# Patient Record
Sex: FEMALE | Race: WHITE | Employment: STUDENT | URBAN - METROPOLITAN AREA
[De-identification: names, ages, dates, MRNs, and addresses within clinical notes are randomized per-mention and may not be internally consistent; named-entity substitution may affect disease eponyms.]

---

## 2017-05-31 ENCOUNTER — TELEPHONE (OUTPATIENT)
Dept: NEUROLOGY | Facility: CLINIC | Age: 47
End: 2017-05-31

## 2017-05-31 ENCOUNTER — OFFICE VISIT (OUTPATIENT)
Dept: NEUROLOGY | Facility: CLINIC | Age: 47
End: 2017-05-31

## 2017-05-31 VITALS
BODY MASS INDEX: 33.32 KG/M2 | DIASTOLIC BLOOD PRESSURE: 81 MMHG | HEIGHT: 65 IN | HEART RATE: 82 BPM | RESPIRATION RATE: 16 BRPM | WEIGHT: 200 LBS | SYSTOLIC BLOOD PRESSURE: 131 MMHG

## 2017-05-31 DIAGNOSIS — R53.1 WEAKNESS GENERALIZED: Primary | ICD-10-CM

## 2017-05-31 DIAGNOSIS — R25.2 MUSCLE CRAMPING: ICD-10-CM

## 2017-05-31 DIAGNOSIS — G72.9 MYOPATHY: ICD-10-CM

## 2017-05-31 DIAGNOSIS — G37.9 DEMYELINATING DISEASE (HCC): ICD-10-CM

## 2017-05-31 PROCEDURE — 99204 OFFICE O/P NEW MOD 45 MIN: CPT | Performed by: OTHER

## 2017-05-31 RX ORDER — VALSARTAN 80 MG/1
1 TABLET ORAL DAILY
Refills: 1 | COMMUNITY
Start: 2017-04-04

## 2017-05-31 RX ORDER — CANAGLIFLOZIN 100 MG/1
1 TABLET, FILM COATED ORAL DAILY
Refills: 1 | COMMUNITY
Start: 2017-05-08

## 2017-05-31 RX ORDER — LORAZEPAM 1 MG/1
TABLET ORAL
Qty: 2 TABLET | Refills: 0 | Status: SHIPPED | OUTPATIENT
Start: 2017-05-31 | End: 2017-06-27

## 2017-05-31 RX ORDER — INSULIN GLARGINE 100 [IU]/ML
75 INJECTION, SOLUTION SUBCUTANEOUS DAILY
Refills: 3 | COMMUNITY
Start: 2017-05-05

## 2017-05-31 RX ORDER — INSULIN LISPRO 100 [IU]/ML
70 INJECTION, SOLUTION INTRAVENOUS; SUBCUTANEOUS DAILY
Refills: 2 | COMMUNITY
Start: 2017-05-05

## 2017-05-31 RX ORDER — NICOTINE POLACRILEX 4 MG/1
GUM, CHEWING ORAL
COMMUNITY

## 2017-05-31 RX ORDER — ZOLPIDEM TARTRATE 10 MG/1
1 TABLET ORAL NIGHTLY
Refills: 5 | COMMUNITY
Start: 2017-05-03

## 2017-05-31 RX ORDER — IBUPROFEN 200 MG
200 TABLET ORAL EVERY 6 HOURS PRN
COMMUNITY

## 2017-05-31 NOTE — PROGRESS NOTES
[id number] [place] [address] with [facility]  5/31/2017    1:30 PM      Cc: Whole body weakness    HPI:  She had been seen by me in 2005 in my previous practice for \"same things\".   We had seen some myopathic changes outpatient prescriptions:   •  INVOKANA 100 MG Oral Tab, Take 1 tablet by mouth daily. , Disp: , Rfl: 1  •  LANTUS SOLOSTAR 100 UNIT/ML Subcutaneous Solution Pen-injector, Inject 75 Units into the skin daily. , Disp: , Rfl: 3  •  HUMALOG KWIKPEN 100 UNIT/ML Glycogen storage disease        Lev Rosales MD  Vascular & General Neurology  Director, Multiple Sclerosis Program  [facility]  5/31/2017, Time completed 1:55 PM

## 2017-05-31 NOTE — PATIENT INSTRUCTIONS
Refill policies:    • Allow 2-3 business days for refills; controlled substances may take longer.   • Contact your pharmacy at least 5 days prior to running out of medication and have them send an electronic request or submit request through the [facility] have a procedure or additional testing performed. [facility]) will contact your insurance carrier to obtain pre-certification or prior authorization.     Unfortunately, [facility] has seen an increase in denial of payment even though the p

## 2017-06-01 ENCOUNTER — TELEPHONE (OUTPATIENT)
Dept: SURGERY | Facility: CLINIC | Age: 47
End: 2017-06-01

## 2017-06-01 NOTE — TELEPHONE ENCOUNTER
Started SONJA Leal MA for MRI Brain cpt code 04.52.16.63.71 and spoke with rep Osmin CASILLAS approved  Auth# [id number]  Valid form 06/01/17 to 07/16/17 at [facility] location  Call duration 27:38    Contacted pt and advised of response to make appt

## 2017-06-12 NOTE — TELEPHONE ENCOUNTER
Received a fax from [facility], Lorazepam 1mg tab has been approved    [id number]

## 2017-06-14 ENCOUNTER — TELEPHONE (OUTPATIENT)
Dept: NEUROLOGY | Facility: CLINIC | Age: 47
End: 2017-06-14

## 2017-06-15 ENCOUNTER — TELEPHONE (OUTPATIENT)
Dept: NEUROLOGY | Facility: CLINIC | Age: 47
End: 2017-06-15

## 2017-06-19 ENCOUNTER — TELEPHONE (OUTPATIENT)
Dept: NEUROLOGY | Facility: CLINIC | Age: 47
End: 2017-06-19

## 2017-06-19 NOTE — TELEPHONE ENCOUNTER
Michelle Delarosa calling to report that after her MRI study her hand and arm have been slightly swollen and very painful. She started applying ice and elevating on Saturday and reports some improvement in symptoms. Denies discoloration or significant swelling.  Yusef Valencia

## 2017-06-27 ENCOUNTER — OFFICE VISIT (OUTPATIENT)
Dept: NEUROLOGY | Facility: CLINIC | Age: 47
End: 2017-06-27

## 2017-06-27 VITALS
BODY MASS INDEX: 33.32 KG/M2 | DIASTOLIC BLOOD PRESSURE: 75 MMHG | SYSTOLIC BLOOD PRESSURE: 120 MMHG | WEIGHT: 200 LBS | HEART RATE: 80 BPM | HEIGHT: 65 IN | RESPIRATION RATE: 16 BRPM

## 2017-06-27 DIAGNOSIS — R53.1 WEAKNESS GENERALIZED: Primary | ICD-10-CM

## 2017-06-27 PROCEDURE — 99215 OFFICE O/P EST HI 40 MIN: CPT | Performed by: OTHER

## 2017-06-27 NOTE — PATIENT INSTRUCTIONS
Please have EMG done at earliest convenience  Follow up after testing done   Refill policies:    • Allow 2-3 business days for refills; controlled substances may take longer.   • Contact your pharmacy at least 5 days prior to running out of medication and h Precertification and Prior Authorizations  If your physician has recommended that you have a procedure or additional testing performed.   [facility]) will contact your insurance carrier to obtain pre-certification or prior author

## 2017-06-27 NOTE — PROGRESS NOTES
[facility] Progress Note    HPI  Patient presents with:  Neurologic Problem: Follow up on neuromuscular issues      Patient is new to this author, but previously seen by my neurology associate, Dr. Fermin Heredia.          She presents today brain. Past Medical History:   Diagnosis Date   • Abnormality of gait 9/2005    admitted to [facility], extensive w/u incl.  LP negative, 2nd opinion at [facility]   • DIABETES    • Fibromyalgia    • KIDNEY STONE    • Lyme disease     L/E and generalized weakne TABLET DAILY, Disp: , Rfl:   •  JOLESSA 0.15-0.03 MG OR TABS, 1 TABLET DAILY, Disp: , Rfl:   •  ZYRTEC 10 MG OR TABS, 1 TABLET DAILY, Disp: , Rfl:     Review of Systems:  No chest pain or palpitations; otherwise as noted in HPI.     Exam:  /75 (BP Loc extensive lab testing (myasthenia Ab, carnitine, acyl carnitine and ratio, lactate, Ca channel Ab, copper zinc) as well as normal MRI brain.          Etiology is unclear but given her continued symptoms and possible prior EMG and muscle biopsy findings, tari

## 2017-07-13 ENCOUNTER — OFFICE VISIT (OUTPATIENT)
Dept: NEUROLOGY | Facility: CLINIC | Age: 47
End: 2017-07-13

## 2017-07-13 DIAGNOSIS — R53.1 WEAKNESS GENERALIZED: Primary | ICD-10-CM

## 2017-07-13 PROCEDURE — 95913 NRV CNDJ TEST 13/> STUDIES: CPT | Performed by: OTHER

## 2017-07-13 PROCEDURE — 95886 MUSC TEST DONE W/N TEST COMP: CPT | Performed by: OTHER

## 2017-07-17 NOTE — PROCEDURES
[facility]  [address] [place], [address] [facility]  Ph: [phone]  FAX: [phone]        Full Name: Snow Avila Gender: Female  Patient ID: [id number] YOB: 1970      Visit Date: 7/13/2017 10:44  Age: Nerve / Sites Muscle Latency Amplitude Amp % Duration Area Segments Distance Lat Diff Velocity     ms mV % ms mVms  cm ms m/s   R Median - APB      Wrist APB 3.13 6.9 100 6.77 20.3 Wrist - APB 7        Elbow APB 7.24 6.1 88.5 6.67 18.9 Elbow - Wrist 22 4. 1 R. Biceps brachii Musculocutaneous C5-C6 N None None None None N N N N   R. Triceps brachii Radial C6-C8 N None None None None N N N N   R. Extensor digitorum communis Radial C7-C8 N None None None None N N N N   R.  First dorsal interosseous Ulnar C8-T1 N This is a midly abnormal study.   There is evidence to suggest a mild R median nerve entrapment across the wrist (ie carpal tunnel syndrome)     There is no electrophysiologic evidence consistent with a large fiber neuropathy or myopathy as clinically quest

## 2017-08-10 ENCOUNTER — OFFICE VISIT (OUTPATIENT)
Dept: NEUROLOGY | Facility: CLINIC | Age: 47
End: 2017-08-10

## 2017-08-10 VITALS
HEIGHT: 65 IN | BODY MASS INDEX: 33.32 KG/M2 | HEART RATE: 82 BPM | WEIGHT: 200 LBS | RESPIRATION RATE: 16 BRPM | SYSTOLIC BLOOD PRESSURE: 124 MMHG | DIASTOLIC BLOOD PRESSURE: 74 MMHG

## 2017-08-10 DIAGNOSIS — R79.82 ELEVATED C-REACTIVE PROTEIN (CRP): Primary | ICD-10-CM

## 2017-08-10 DIAGNOSIS — G43.709 CHRONIC MIGRAINE WITHOUT AURA WITHOUT STATUS MIGRAINOSUS, NOT INTRACTABLE: ICD-10-CM

## 2017-08-10 DIAGNOSIS — IMO0001 PARESTHESIAS/NUMBNESS: ICD-10-CM

## 2017-08-10 DIAGNOSIS — G43.109 BASILAR MIGRAINE: ICD-10-CM

## 2017-08-10 PROCEDURE — 99214 OFFICE O/P EST MOD 30 MIN: CPT | Performed by: OTHER

## 2017-08-10 NOTE — PROGRESS NOTES
[facility] Progress Note    HPI  Patient presents with:  Weakness:  Follow up on EMG results  Migraine: Patient was in the hospital last week due to having a migraine and still has a headache    As per my initial visit, 6/27/17:   \"  Pa Other labs include normal Ca Channel AB, AChR Ab, carnitine, acyl carnitine, lactic acid, and intrinsic factor blocking Ab. Patient has also had normal ischemic forearm test, copper, Zinc and MRI brain. \"          Patient now presents in follow up.    Xochilt Grandchild Marital status:             Spouse name:                       Years of education:                 Number of children:               Social History Main Topics    Smoking status: Never Smoker Extremities: no edema, peripheral pulses intact    Neck: supple, full range of motion; no carotid bruits    Neuro:  Mental status:  Orientation: Alert and oriented to person, place, time  Speech Fluent and conversational    CN: PERRL, EOMI with no nystagmu     ms mV % ms mVms   cm ms m/s   R Median - APB      Wrist APB 3.13 6.9 100 6.77 20.3 Wrist - APB 7          Elbow APB 7.24 6.1 88.5 6.67 18.9 Elbow - Wrist 22 4.11 53   L Median - APB      Wrist APB 2.92 12.8 100 6.15 38.5 Wrist - APB 7          Elbow AP R. Triceps brachii Radial C6-C8 N None None None None N N N N   R. Extensor digitorum communis Radial C7-C8 N None None None None N N N N   R. First dorsal interosseous Ulnar C8-T1 N None None None None N N N N   L.  Vastus medialis Femoral L2-L4 N None Non There is no electrophysiologic evidence consistent with a large fiber neuropathy or myopathy as clinically questioned. Impression/ Plan:  Lilly Monte is a 49year old woman with PMHx significant for fibromyalgia as well as DM2 and ?  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

## 2017-08-10 NOTE — PATIENT INSTRUCTIONS
Have lab work done  [employer] verapamil 120 mg daily   Take ASA 81 mg daily   Follow up in 1-2 months   Refill policies:    • Allow 2-3 business days for refills; controlled substances may take longer.   • Contact your pharmacy at least 5 days prior to running billed. Precertification and Prior Authorizations  If your physician has recommended that you have a procedure or additional testing performed.   [facility]) will contact your insurance carrier to obtain pre-certification or arline

## 2017-08-17 ENCOUNTER — TELEPHONE (OUTPATIENT)
Dept: NEUROLOGY | Facility: CLINIC | Age: 47
End: 2017-08-17

## 2017-08-17 NOTE — TELEPHONE ENCOUNTER
Started on verapamil Sunday night. Has episode of \"a fib\" a couple times a night. Heart is fluttering for a minute or two and then it stops. Wishing to discontinue medication due to side effects.

## 2017-08-17 NOTE — TELEPHONE ENCOUNTER
Per Dr. Raj Worthy okay to discontinue medication at this time. She may try 40 mg TID dosing instead. Patient informed. She would prefer to discontinue at this time.  She may consider restarting at the lower TID dosing and will call office if she does deci

## 2017-09-08 ENCOUNTER — TELEPHONE (OUTPATIENT)
Dept: NEUROLOGY | Facility: CLINIC | Age: 47
End: 2017-09-08

## 2017-10-03 ENCOUNTER — OFFICE VISIT (OUTPATIENT)
Dept: NEUROLOGY | Facility: CLINIC | Age: 47
End: 2017-10-03

## 2017-10-03 VITALS — HEART RATE: 106 BPM | SYSTOLIC BLOOD PRESSURE: 132 MMHG | DIASTOLIC BLOOD PRESSURE: 78 MMHG | RESPIRATION RATE: 18 BRPM

## 2017-10-03 DIAGNOSIS — R79.82 ELEVATED C-REACTIVE PROTEIN (CRP): ICD-10-CM

## 2017-10-03 DIAGNOSIS — R29.818 TRANSIENT NEUROLOGICAL SYMPTOMS: Primary | ICD-10-CM

## 2017-10-03 DIAGNOSIS — G43.709 CHRONIC MIGRAINE WITHOUT AURA WITHOUT STATUS MIGRAINOSUS, NOT INTRACTABLE: ICD-10-CM

## 2017-10-03 PROCEDURE — 99214 OFFICE O/P EST MOD 30 MIN: CPT | Performed by: OTHER

## 2017-10-03 NOTE — PATIENT INSTRUCTIONS
Have EEG done at earliest convenience  Schedule skin biopsy prior to next visit  Follow up in 3 months, after all testing and results done   Refill policies:    • Allow 2-3 business days for refills; controlled substances may take longer.   • Contact your p may be responsible for the entire amount billed. Precertification and Prior Authorizations  If your physician has recommended that you have a procedure or additional testing performed.   [facility] ([facility]) will contact your insurance c

## 2017-10-03 NOTE — PROGRESS NOTES
[facility] Progress Note    HPI  Patient presents with:  Migraine    As per my initial visit, 6/27/17:   \"  Patient is new to this author, but previously seen by my neurology associate, Dr. Isma Pappas.          She presents today for yaniv Other labs include normal Ca Channel AB, AChR Ab, carnitine, acyl carnitine, lactic acid, and intrinsic factor blocking Ab. Patient has also had normal ischemic forearm test, copper, Zinc and MRI brain. \"          Patient now presents in follow up. Smoking status: Never Smoker                                                                Smokeless tobacco: Never Used                        Alcohol use:  No              Drug use: No            Other Topics            Concern  Caffeine Concern CN: PERRL, EOMI with no nystagmus, VFF, smile symmetric, sensation intact, tongue and palate midline, SCM/hearing intact, otherwise, CN 2-12 intact  Motor: 5/5 strength throughout, tone normal  DTR: 3+ brisk symmetric throughout, toes downgoing bilaterally Wrist APB 2.92 12.8 100 6.15 38.5 Wrist - APB 7          Elbow APB 7.24 11.4 88.9 6.35 33.9 Elbow - Wrist 22 4.32 51   R Ulnar - ADM      Wrist ADM 1.93 10.9 100 5.16 33.9 Wrist - ADM 7          B. Elbow ADM 5.47 10.0 91.4 5.26 33.7 B. Elbow - Wrist 20.5 R. First dorsal interosseous Ulnar C8-T1 N None None None None N N N N   L. Vastus medialis Femoral L2-L4 N None None None None N N N N   L. Tibialis anterior Deep peroneal (Fibular) L4-L5 N None None None None N N N N   L.  Gastrocnemius (Medial head) Tibi Tomasa White is a 49year old woman with PMHx significant for fibromyalgia as well as DM2 and ? Lyme disease, who originally presented for evaluation of generalized fatigue and weakness.             Neurologic exam is normal at this time with no evidence fo Return in about 3 months (around 1/3/2018).      30 total minutes spent with patient >50% of visit was spent in counseling and coordination of care, including discussion of differential diagnosis, workup to date, future diagnostic and therapeutic options; p

## 2018-01-17 ENCOUNTER — TELEPHONE (OUTPATIENT)
Dept: NEUROLOGY | Facility: CLINIC | Age: 48
End: 2018-01-17

## 2018-01-30 ENCOUNTER — TELEPHONE (OUTPATIENT)
Dept: NEUROLOGY | Facility: CLINIC | Age: 48
End: 2018-01-30

## 2018-01-30 DIAGNOSIS — G43.709 CHRONIC MIGRAINE WITHOUT AURA WITHOUT STATUS MIGRAINOSUS, NOT INTRACTABLE: Primary | ICD-10-CM

## 2018-01-30 NOTE — TELEPHONE ENCOUNTER
At EEG she had strobe light and that triggered a migraine; she has not been able to shake it. Sometimes it waxes and wanes, but last night she was vomiting again. Until this migraines were under control.  She can usually use caffeine / NSAID with good re

## 2018-02-01 RX ORDER — SUMATRIPTAN 50 MG/1
50 TABLET, FILM COATED ORAL EVERY 2 HOUR PRN
Qty: 9 TABLET | Refills: 0 | Status: SHIPPED | OUTPATIENT
Start: 2018-02-01 | End: 2018-02-06

## 2018-02-01 NOTE — TELEPHONE ENCOUNTER
We can give sumatriptan for migraine abortive treatment if agrees     Will send Rx - let patient know

## 2018-02-06 ENCOUNTER — OFFICE VISIT (OUTPATIENT)
Dept: NEUROLOGY | Facility: CLINIC | Age: 48
End: 2018-02-06

## 2018-02-06 VITALS
SYSTOLIC BLOOD PRESSURE: 127 MMHG | WEIGHT: 200 LBS | DIASTOLIC BLOOD PRESSURE: 77 MMHG | HEIGHT: 65 IN | RESPIRATION RATE: 16 BRPM | HEART RATE: 78 BPM | BODY MASS INDEX: 33.32 KG/M2

## 2018-02-06 DIAGNOSIS — G43.711 INTRACTABLE CHRONIC MIGRAINE WITHOUT AURA AND WITH STATUS MIGRAINOSUS: Primary | ICD-10-CM

## 2018-02-06 DIAGNOSIS — G44.209 TENSION HEADACHE: ICD-10-CM

## 2018-02-06 DIAGNOSIS — M62.838 MUSCLE SPASM: ICD-10-CM

## 2018-02-06 PROCEDURE — 99214 OFFICE O/P EST MOD 30 MIN: CPT | Performed by: OTHER

## 2018-02-06 PROCEDURE — 96372 THER/PROPH/DIAG INJ SC/IM: CPT | Performed by: OTHER

## 2018-02-06 RX ORDER — SUMATRIPTAN 100 MG/1
100 TABLET, FILM COATED ORAL EVERY 2 HOUR PRN
Qty: 9 TABLET | Refills: 0 | Status: SHIPPED | OUTPATIENT
Start: 2018-02-06 | End: 2018-03-08

## 2018-02-06 RX ORDER — CYCLOBENZAPRINE HCL 10 MG
10 TABLET ORAL 3 TIMES DAILY PRN
Qty: 90 TABLET | Refills: 0 | Status: SHIPPED | OUTPATIENT
Start: 2018-02-06

## 2018-02-06 RX ORDER — AMITRIPTYLINE HYDROCHLORIDE 10 MG/1
TABLET, FILM COATED ORAL
Qty: 60 TABLET | Refills: 2 | Status: SHIPPED | OUTPATIENT
Start: 2018-02-06

## 2018-02-06 RX ORDER — KETOROLAC TROMETHAMINE 30 MG/ML
30 INJECTION, SOLUTION INTRAMUSCULAR; INTRAVENOUS ONCE
Status: COMPLETED | OUTPATIENT
Start: 2018-02-06 | End: 2018-02-06

## 2018-02-06 RX ADMIN — Medication 10 MG: at 11:00:00

## 2018-02-06 RX ADMIN — KETOROLAC TROMETHAMINE 30 MG: 30 INJECTION, SOLUTION INTRAMUSCULAR; INTRAVENOUS at 11:00:00

## 2018-02-06 NOTE — PATIENT INSTRUCTIONS
Take amitriptyline 10 mg nightly x 1 week, then increase to 20 mg nightly   Follow up in 2 months   Refill policies:    • Allow 2-3 business days for refills; controlled substances may take longer.   • Contact your pharmacy at least 5 days prior to running entire amount billed. Precertification and Prior Authorizations  If your physician has recommended that you have a procedure or additional testing performed.   [facility]) will contact your insurance carrier to obtain pre-certif

## 2018-02-06 NOTE — PROGRESS NOTES
[facility] Progress Note    HPI  Patient presents with:  Weakness: Follow up and having numbnedss in the right arm  Migraine:  Follow up and having a migraine since EEG    As per my initial visit, 6/27/17:   \"  Patient is new to this au Other labs include normal Ca Channel AB, AChR Ab, carnitine, acyl carnitine, lactic acid, and intrinsic factor blocking Ab. Patient has also had normal ischemic forearm test, copper, Zinc and MRI brain. \"          Patient now presents in follow up.    A Comment: Muscles  6/15/09: COLONOSCOPY,DIAGNOSTIC      Comment: colon and TI wnl, random bx wnl  No date: OTHER SURGICAL HISTORY      Comment: ESWL for kidney stones  6/15/09: UPPER GI ENDOSCOPY,BIOPSY      Comment: wnl, SB Biopsywnl  History reviewed. •  Omeprazole 20 MG Oral Tab EC, Take by mouth., Disp: , Rfl:   •  PAROXETINE HCL 20 MG OR TABS, 1 TABLET DAILY, Disp: , Rfl:   •  ZYRTEC 10 MG OR TABS, 1 TABLET DAILY, Disp: , Rfl:     Review of Systems:  No chest pain or palpitations; otherwise as noted Wrist Dig V 1.77 2.50 55.9 69.4 Wrist - Dig V 11   62   L Ulnar - Digit V (Antidromic)      Wrist Dig V 1.77 2.45 55.0 70.9 Wrist - Dig V 11   62   R Radial - Anatomical snuff box (Forearm)      Forearm Wrist 1.61 2.29 35.5 21.0 Forearm - Wrist 10   62 F  Wave      Nerve F Lat M Lat F-M Lat     ms ms ms   R Median - APB 25.3 3.3 21.9   R Ulnar - ADM 24.8 1.9 22.9   L Median - APB 24.8 3.5 21.3       EMG                       EMG Summary Table       Spontaneous MUAP Recruitment   Muscle Nerve Roots IA Fib 3. Right radial sensory response was normal.  4. Right median to ulnar palmar mixed nerve comparison study was abnormal and demonstrated significant interlatency prolongation of median relative to ulnar nerve (~0.68 msec), consistent with focal slowing acr Inflammatory markers previously elevated  - etiology for her symptoms is unclear at this time; her last episode of new onset unilateral numbness, with headache, normal MRI / MRA may be complicated migraine or TIA - given elevated inflammatory markers - Plan:  SUMAtriptan Succinate 100 MG Oral Tab,         Amitriptyline HCl 10 MG Oral Tab, OP REFERRAL         [facility],         Cyclobenzaprine HCl 10 MG Oral Tab,         Dexamethasone Sodium Phosphate (DECADRON) 10         MG/ML in

## 2018-02-08 ENCOUNTER — TELEPHONE (OUTPATIENT)
Dept: NEUROLOGY | Facility: CLINIC | Age: 48
End: 2018-02-08

## 2018-02-08 NOTE — TELEPHONE ENCOUNTER
Having hard time making coherent sentences. Took amitriptyline 13 hours ago, went limp when she took it for a little bit, hard time keeping eyes open, feeling disconnected from her body. No improvement with toradol/decadron in office.     Headache is now

## 2018-02-09 NOTE — TELEPHONE ENCOUNTER
Atypical reaction for amitriptyline - likely an adjustment period - would recommend continue for at least a week to get used to it - if not improving, recommend ER evaluation for pain control

## 2018-02-09 NOTE — TELEPHONE ENCOUNTER
Patient informed, verbalized understanding. She may hold amitriptyline at this time as she does not like the effects, and feels she does not need preventative. Discussed indications for headache preventatives, verbalized understanding.

## (undated) NOTE — MR AVS SNAPSHOT
EMG [address] [facility] [address] [phone]               Thank you for choosing us for your health care visit with Maria Fernanda Land MD.  We are glad to serve you and happy to provide you with this summary of your v and have them send an electronic request or submit request through the “request refill” option in your [employer] account. ? Refills are not addressed on weekends; covering physicians do not authorize routine medications on weekends.   ? No narcotics or contr insurance carrier to obtain pre-certification or prior authorization. Unfortunately, [facility] has seen an increase in denial of payment even though the procedure/test has been pre-certified.   You are strongly encouraged to contact your insurance carrier to v Generic drug:  cetirizine   1 TABLET DAILY                Where to Get Your Medications      You can get these medications from any pharmacy     Bring a paper prescription for each of these medications    - LORazepam 1 MG Tabs            Ritahart     Sign u active are less likely to develop some chronic diseases than adults who are inactive.      HOW TO GET STARTED: HOW TO STAY MOTIVATED:   Start activities slowly and build up over time Do what you like   Get your heart pumping – brisk walking, biking, swimmin

## (undated) NOTE — LETTER
02/06/18    Dear Dr. aMrkos Cabrera saw your patient, Gayle Tejada for a follow up visit. Please see my progress note enclosed below. Let me know if you have any questions.     Thank you  Marisol Helms MD, Neurology  [facility] numbness; denies family history of this issue. She sometimes feels she has \"wet\" sensation on various parts of her body. Migraines also are chronic and is on valsartan.        Workup in past has included EMG which showed possible myopathic units R d admitted to [facility], extensive w/u incl.  LP negative, 2nd opinion at [facility]   • DIABETES    • Fibromyalgia    • KIDNEY STONE    • Lyme disease     L/E and generalized weakness and fatigue/chronic pain, being tereated for chronic Lyme disease by MD in [place] •  HUMALOG KWIKPEN 100 UNIT/ML Subcutaneous Solution Pen-injector, Inject 70 Units into the skin daily. , Disp: , Rfl: 2  •  valsartan 80 MG Oral Tab, Take 1 tablet by mouth daily. , Disp: , Rfl: 1  •  Zolpidem Tartrate 10 MG Oral Tab, Take 1 tablet by mouth Prior as noted below:   EMG 7/2017:     Sensory NCS      Nerve / Sites Rec.  Site Onset Lat Peak Lat NP Amp PP Amp Segments Distance Peak Diff Velocity       ms ms µV µV   cm ms m/s   R Median - Digit II (Antidromic)      Wrist Dig II 2.60 3.28 58.6 91.0 Wr Ankle EDB 3.59 8.4 100 5.57 25.4 Ankle - EDB 7          Fib head EDB 9.64 7.8 93.1 5.89 24.8 Fib head - Ankle 29.5 6.04 49   R Tibial - AH      Ankle AH 3.49 18.4 100 4.95 39.6 Ankle - AH 8          Pop fossa AH 10.42 12.4 67.2 5.57 33.8 Pop fossa - Ank R. Tibialis anterior Deep peroneal (Fibular) L4-L5 N None None None None N N N N   R. Gastrocnemius (Medial head) Tibial S1-S2 N None None None None N N N N   R.  Extensor hallucis longus Deep peroneal (Fibular) L5-S1 N None None None None N N N N           for underlying large fiber neuropathy, only noting incidental minor right carpal tunnel syndrome. This would not explain all of her diffuse symptomatology.        Inflammatory markers previously elevated  - etiology for her symptoms is unclear at this time MG/ML injection 10 mg, ketorolac tromethamine         (TORADOL) 30 MG/ML injection 30 mg        As noted above     (E30.528) Muscle spasm  Plan: SUMAtriptan Succinate 100 MG Oral Tab,         Amitriptyline HCl 10 MG Oral Tab, OP REFERRAL         TO [facility]